# Patient Record
Sex: FEMALE | Race: BLACK OR AFRICAN AMERICAN | NOT HISPANIC OR LATINO | URBAN - NONMETROPOLITAN AREA
[De-identification: names, ages, dates, MRNs, and addresses within clinical notes are randomized per-mention and may not be internally consistent; named-entity substitution may affect disease eponyms.]

---

## 2018-08-06 PROBLEM — H35.361: Noted: 2018-08-06

## 2018-08-06 PROBLEM — H35.372: Noted: 2018-08-06

## 2018-08-06 PROBLEM — Z96.1: Noted: 2018-08-06

## 2018-08-06 PROBLEM — H26.493: Noted: 2018-08-06

## 2018-08-06 PROBLEM — E11.9: Noted: 2018-08-06

## 2018-08-06 PROBLEM — H40.003: Noted: 2018-08-06

## 2019-03-04 ENCOUNTER — IMPORTED ENCOUNTER (OUTPATIENT)
Dept: URBAN - NONMETROPOLITAN AREA CLINIC 1 | Facility: CLINIC | Age: 84
End: 2019-03-04

## 2019-03-04 PROCEDURE — 99214 OFFICE O/P EST MOD 30 MIN: CPT

## 2019-03-04 NOTE — PATIENT DISCUSSION
AODM - 1979. -BS this am was 107.-Unknown A1C: going to MD tomorrow.-Stressed the importance of keeping blood sugars under control and regular visits with PCP. -Explained the possible effects of poorly controlled diabetes and the damage that diabetes can cause to ocular health. -Pt instructed to contact our office with any vision changes.- Dr. Tangela Pike reviewed notes from Monika Lees from 12/6/18. - Continue F/U appt with Ayesha  in December 2019- Orderd IVFA at next visit - RTC 6 Mo DFE and IVFA Early PCO : OU-Explained symptoms of advancing PCO. -Continue to monitor for now. Pt will notify us if any new symptoms develop. 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 MAC OCT -8/6/18

## 2019-09-03 ENCOUNTER — IMPORTED ENCOUNTER (OUTPATIENT)
Dept: URBAN - NONMETROPOLITAN AREA CLINIC 1 | Facility: CLINIC | Age: 84
End: 2019-09-03

## 2019-09-03 PROBLEM — Z96.1: Noted: 2018-08-06

## 2019-09-03 PROBLEM — H40.003: Noted: 2018-08-06

## 2019-09-03 PROBLEM — H26.493: Noted: 2020-03-03

## 2019-09-03 PROBLEM — E11.9: Noted: 2019-09-03

## 2019-09-03 PROBLEM — H26.493: Noted: 2018-08-06

## 2019-09-03 PROBLEM — H35.372: Noted: 2019-09-03

## 2019-09-03 PROBLEM — Z96.1: Noted: 2020-03-03

## 2019-09-03 PROBLEM — H35.372: Noted: 2020-03-03

## 2019-09-03 PROBLEM — H35.363: Noted: 2019-09-03

## 2019-09-03 PROCEDURE — 92014 COMPRE OPH EXAM EST PT 1/>: CPT

## 2019-09-03 NOTE — PATIENT DISCUSSION
AODM - 1979. -BS this am was 138-Unknown A1C: 7.5%  in  May 2019-Stressed the importance of keeping blood sugars under control and regular visits with PCP. -Explained the possible effects of poorly controlled diabetes and the damage that diabetes can cause to ocular health. -Pt instructed to contact our office with any vision changes. - Continue F/U appt with Ayesha  in December 2019-RTC 6 month dilation F/U DM Early PCO : OU-Explained symptoms of advancing PCO. -Continue to monitor for now. Pt will notify us if any new symptoms develop. Pseudophakic OUMac Pucker Mittie Edge Suspect-Based on history of high IOP.- IOP's today 17 OU -Appears stable at this time.-Continue to monitor with exams and testing.; 's Notes: Pt stopped gtts prematurally. MAC OCT -8/6/18

## 2020-03-03 ENCOUNTER — IMPORTED ENCOUNTER (OUTPATIENT)
Dept: URBAN - NONMETROPOLITAN AREA CLINIC 1 | Facility: CLINIC | Age: 85
End: 2020-03-03

## 2020-03-03 PROCEDURE — 92014 COMPRE OPH EXAM EST PT 1/>: CPT

## 2020-03-03 NOTE — PATIENT DISCUSSION
AODM - 1979. -BS this am was 138-Unknown A1C: 7.5%  in  May 2019-Stressed the importance of keeping blood sugars under control and regular visits with PCP. -Explained the possible effects of poorly controlled diabetes and the damage that diabetes can cause to ocular health. -Pt instructed to contact our office with any vision changes. keep appt with dr Norma Marie PCO : OU-Explained symptoms of advancing PCO. -Continue to monitor for now. Pt will notify us if any new symptoms develop. Pseudophakic OUmonitorMac Pucker OUmonitor; Dr's Notes: Pt stopped gtts prematurally. MAC OCT -8/6/18PCP Yelena Mays @ [facility]

## 2021-07-29 ENCOUNTER — IMPORTED ENCOUNTER (OUTPATIENT)
Dept: URBAN - NONMETROPOLITAN AREA CLINIC 1 | Facility: CLINIC | Age: 86
End: 2021-07-29

## 2021-07-29 PROCEDURE — 99213 OFFICE O/P EST LOW 20 MIN: CPT

## 2021-07-29 NOTE — PATIENT DISCUSSION
*Allergic Conjunctivitis:.-Discussed findings of exam in detail with the patient. - Discussed the episodic nature of the condition and treatment options with the patient. - The use of artificial tears and cool compresses were discussed with patient. - The offending allergen should be avoided if possible. - Patient advised not to rub eyes. - Prescription drops recommended.; Dr's Notes: Pt stopped gtts prematurally. <br /><br />MAC OCT -8/6/18<br />PCP Rhys García @ [facility]<br />

## 2021-09-08 PROBLEM — E11.9: Noted: 2021-09-08

## 2021-09-08 PROBLEM — Z96.1: Noted: 2021-09-08

## 2021-09-08 PROBLEM — H26.493: Noted: 2021-09-08

## 2021-09-08 PROBLEM — H35.372: Noted: 2021-09-08

## 2021-09-08 PROBLEM — H10.45: Noted: 2021-09-08

## 2021-11-08 ENCOUNTER — IMPORTED ENCOUNTER (OUTPATIENT)
Dept: URBAN - NONMETROPOLITAN AREA CLINIC 1 | Facility: CLINIC | Age: 86
End: 2021-11-08

## 2021-11-08 PROCEDURE — 99213 OFFICE O/P EST LOW 20 MIN: CPT

## 2021-11-08 NOTE — PATIENT DISCUSSION
*Allergic Conjunctivitis:.-Discussed findings of exam in detail with the patient. - Discussed the episodic nature of the condition and treatment options with the patient. - The use of artificial tears and cool compresses were discussed with patient. - The offending allergen should be avoided if possible. - Patient advised not to rub eyes. - Prescription drops recommended. pt elects to use tears prnokay to call in maxitrol if desires; Dr's Notes: Pt stopped gtts prematurally. [facility] [place] OCT -8/6/18PCP Carmela Delgado @ [facility]

## 2022-04-09 ASSESSMENT — VISUAL ACUITY
OD_CC: 20/20
OS_CC: 20/100
OS_SC: 20/25
OS_SC: 20/30
OD_SC: 20/25-2
OD_CC: 20/70
OD_SC: 20/25
OS_SC: 20/30
OD_SC: 20/25-
OS_CC: 20/20
OS_SC: 20/30
OD_SC: 20/30

## 2022-04-09 ASSESSMENT — TONOMETRY
OD_IOP_MMHG: 17
OS_IOP_MMHG: 17
OD_IOP_MMHG: 17
OS_IOP_MMHG: 21
OD_IOP_MMHG: 18
OS_IOP_MMHG: 17
OS_IOP_MMHG: 19
OD_IOP_MMHG: 21

## 2023-08-25 ENCOUNTER — COMPREHENSIVE EXAM (OUTPATIENT)
Dept: RURAL CLINIC 1 | Facility: CLINIC | Age: 88
End: 2023-08-25

## 2023-08-25 DIAGNOSIS — E11.9: ICD-10-CM

## 2023-08-25 DIAGNOSIS — Z96.1: ICD-10-CM

## 2023-08-25 DIAGNOSIS — H35.372: ICD-10-CM

## 2023-08-25 DIAGNOSIS — H26.493: ICD-10-CM

## 2023-08-25 DIAGNOSIS — H02.115: ICD-10-CM

## 2023-08-25 PROCEDURE — 92014 COMPRE OPH EXAM EST PT 1/>: CPT

## 2023-08-25 ASSESSMENT — TONOMETRY
OD_IOP_MMHG: 18
OS_IOP_MMHG: 18

## 2023-08-25 ASSESSMENT — VISUAL ACUITY
OS_CC: 20/30
OU_CC: 20/50-1
OD_CC: 20/30
OD_CC: 20/60
OS_CC: 20/50-1
OU_CC: 20/30

## 2025-04-08 ENCOUNTER — COMPREHENSIVE EXAM (OUTPATIENT)
Age: OVER 89
End: 2025-04-08

## 2025-04-08 DIAGNOSIS — H02.115: ICD-10-CM

## 2025-04-08 DIAGNOSIS — E11.9: ICD-10-CM

## 2025-04-08 DIAGNOSIS — Z96.1: ICD-10-CM

## 2025-04-08 DIAGNOSIS — H35.372: ICD-10-CM

## 2025-04-08 DIAGNOSIS — H26.493: ICD-10-CM

## 2025-04-08 PROCEDURE — 92014 COMPRE OPH EXAM EST PT 1/>: CPT
